# Patient Record
Sex: FEMALE | Race: OTHER | Employment: UNEMPLOYED | URBAN - METROPOLITAN AREA
[De-identification: names, ages, dates, MRNs, and addresses within clinical notes are randomized per-mention and may not be internally consistent; named-entity substitution may affect disease eponyms.]

---

## 2019-09-03 ENCOUNTER — OFFICE VISIT (OUTPATIENT)
Dept: FAMILY MEDICINE CLINIC | Age: 34
End: 2019-09-03

## 2019-09-03 VITALS
WEIGHT: 210 LBS | DIASTOLIC BLOOD PRESSURE: 70 MMHG | RESPIRATION RATE: 18 BRPM | SYSTOLIC BLOOD PRESSURE: 122 MMHG | OXYGEN SATURATION: 96 % | HEART RATE: 74 BPM | TEMPERATURE: 97.8 F | BODY MASS INDEX: 34.99 KG/M2 | HEIGHT: 65 IN

## 2019-09-03 DIAGNOSIS — J30.89 ENVIRONMENTAL AND SEASONAL ALLERGIES: ICD-10-CM

## 2019-09-03 DIAGNOSIS — F41.9 ANXIETY DISORDER, UNSPECIFIED TYPE: Primary | ICD-10-CM

## 2019-09-03 DIAGNOSIS — S93.402A SPRAIN OF LEFT ANKLE, UNSPECIFIED LIGAMENT, INITIAL ENCOUNTER: ICD-10-CM

## 2019-09-03 DIAGNOSIS — J45.20 MILD INTERMITTENT ASTHMA WITHOUT COMPLICATION: ICD-10-CM

## 2019-09-03 RX ORDER — SERTRALINE HYDROCHLORIDE 100 MG/1
100 TABLET, FILM COATED ORAL DAILY
Qty: 30 TAB | Refills: 2 | Status: SHIPPED | OUTPATIENT
Start: 2019-09-03

## 2019-09-03 RX ORDER — CETIRIZINE HCL 10 MG
10 TABLET ORAL DAILY
Qty: 30 TAB | Refills: 2 | Status: SHIPPED | OUTPATIENT
Start: 2019-09-03 | End: 2019-10-17 | Stop reason: SDUPTHER

## 2019-09-03 RX ORDER — CETIRIZINE HCL 10 MG
TABLET ORAL
COMMUNITY
End: 2019-09-03 | Stop reason: SDUPTHER

## 2019-09-03 RX ORDER — IBUPROFEN 800 MG/1
800 TABLET ORAL
Qty: 60 TAB | Refills: 1 | Status: SHIPPED | OUTPATIENT
Start: 2019-09-03

## 2019-09-03 RX ORDER — ACETAMINOPHEN AND CODEINE PHOSPHATE 120; 12 MG/5ML; MG/5ML
SOLUTION ORAL
COMMUNITY

## 2019-09-03 RX ORDER — ALBUTEROL SULFATE 90 UG/1
AEROSOL, METERED RESPIRATORY (INHALATION)
COMMUNITY
End: 2019-11-12 | Stop reason: SDUPTHER

## 2019-09-03 RX ORDER — SERTRALINE HYDROCHLORIDE 100 MG/1
125 TABLET, FILM COATED ORAL DAILY
COMMUNITY
End: 2019-09-03 | Stop reason: SDUPTHER

## 2019-09-03 RX ORDER — SERTRALINE HYDROCHLORIDE 25 MG/1
25 TABLET, FILM COATED ORAL DAILY
Qty: 30 TAB | Refills: 2 | Status: SHIPPED | OUTPATIENT
Start: 2019-09-03

## 2019-09-03 NOTE — PROGRESS NOTES
Today's Date:  9/3/2019   Patient's Name: Celso Hernandez   Patient's :  1985     Subjective:  Chief Complaint   Patient presents with   [address]    Medication Refill       Celso Hernandez  Is a 37 y.o.  female  who presents for initial visit to establish care and medication refill. Last PCP was Dr. Alice Justice in [place], [place] [address]. Has a past medical history of Anxiety (since she was a teen); Started medication in . Takes Sertraline 100 mg daily which used to help her symptoms but states that since moving here last month symptoms have gotten worse. She is more anxious that usual. Denies panic attacks. She tolerating medication well. Also has Environmental allergies for which she take Cetirizine and Flonase. States that she sprained her left ankle while they were moving and she still has some pain which is usually relieve by Motrin and she would like medication refilled. Has been using an ankle brace that helps. Last pap was in May in [place] and was normal.   Has a history of Asthma, uses her inhaler as needed  Also saw a Cardiology for Racing heart  Also talk a Therapist for Anxiety. Will request medical record. Past Medical History:  Past Medical History:   Diagnosis Date    Anxiety        Past Surgical History:  Past Surgical History:   Procedure Laterality Date    HX HEENT      tonsilectomy    HX ORTHOPAEDIC Right     knee    HX ORTHOPAEDIC Right     hand    HX WISDOM TEETH EXTRACTION         Social history:  Social History     Socioeconomic History    Marital status:      Spouse name: Not on file    Number of children: Not on file    Years of education: Not on file    Highest education level: Not on file   Tobacco Use    Smoking status: Current Every Day Smoker     Types: Cigarettes    Smokeless tobacco: Never Used   Substance and Sexual Activity    Alcohol use:  Yes    Drug use: Never    Sexual activity: Yes       Medications:  Current Outpatient Medications   Medication Sig Dispense Refill    norethindrone (ORTHO MICRONOR) 0.35 mg tab Take  by mouth.  sertraline (ZOLOFT) 100 mg tablet Take 125 mg by mouth daily.  cetirizine (ZYRTEC) 10 mg tablet Take  by mouth.  fluticasone propionate (FLONASE NA) by Nasal route.  albuterol (PROAIR HFA) 90 mcg/actuation inhaler Take  by inhalation. Allergies: Allergies   Allergen Reactions    Sudafed [Pseudoephedrine Hcl] Swelling       Past Family History:   Family History   Problem Relation Age of Onset    Cancer Father         pancreatic    Elevated Lipids Father     Cancer Maternal Grandmother         breast cancer    Heart Disease Maternal Grandmother     Diabetes Paternal Grandmother     Heart Disease Paternal Grandmother          REVIEW OF SYSTEMS:   Constitutional  no wt loss, night sweats, unexplained fevers  Oral  no mouth pain, tongue or tooth problems  Ears  no hearing loss, ear pain, fullness, no swallowing problems  Cardiac  no CP, PND, orthopnea, edema, palpitations or syncope  Resp  no wheezing, chronic coughing, dyspnea  GI  no heartburn, nausea, vomiting, change in bowel habits, bleeding,   Urinary  no dysuria, hematuria, flank pain, urgency, frequency  Ortho  no swelling, dec ROM, myalgias  Derm  no nail abnormalities, rashes, lesions of note, hair loss  Psych  anxiety; denies any depression symptoms, no hallucinations or violent ideation  Neuro  no focal weakness, numbness, paresthesias, incoordination, ataxia, involuntary movements      PHYSICAL EXAM:  Visit Vitals  /70 (BP 1 Location: Right arm, BP Patient Position: Sitting)   Pulse 74   Temp 97.8 °F (36.6 °C) (Oral)   Resp 18   Ht 5' 5\" (1.651 m)   Wt 210 lb (95.3 kg)   SpO2 96%   BMI 34.95 kg/m²     Pt alert and oriented x 3. Affect is appropriate. Mood stable. No apparent distress  EYE: PERRL,conjunctiva and lids normal,    Neck--Supple, trachea midline.  No adenopathy,   Lungs --Clear to auscultation and percussion, normal percussion. Heart --Regular rate and rhythm, no murmurs, rubs, gallops, or clicks. Abdomen -- Soft and nontender, no hepatosplenomegaly or masses. Extremities -- Without cyanosis, clubbing, edema. 2+ pulses equally and bilaterally. Mild swelling on lateral aspect of left ankle, mild tenderness, no redness or lesion;  Neuro -- CN 2-12 grossly intact, strength 5/5 with intact pinprick, and soft touch in all  extremities,   Derm-- no obvious abnormalities noted, no rash or redness, skin warm, moist, and dry. Assessment:       1. Anxiety disorder, unspecified type  - Dose increased as the move can be contributing to increased in her  symptoms. - sertraline (ZOLOFT) 100 mg tablet; Take 1 Tab by mouth daily. Dispense: 30 Tab; Refill: 2  - sertraline (ZOLOFT) 25 mg tablet; Take 1 Tab by mouth daily. Dispense: 30 Tab; Refill: 2    2. Environmental and seasonal allergies  - Refilled medication  - cetirizine (ZYRTEC) 10 mg tablet; Take 1 Tab by mouth daily. Dispense: 30 Tab; Refill: 2    3. Sprain of left ankle, unspecified ligament, initial encounter  - Apply heat/Ice, keep extremity elevated. Continue wearing the brace. - ibuprofen (MOTRIN) 800 mg tablet; Take 1 Tab by mouth every eight (8) hours as needed for Pain. Dispense: 60 Tab; Refill: 1    4. Mild intermittent asthma without complication  - Continue used inhaler as prescribed. Patient verbalized understanding and is in agreement with treatment plan as outlined above. All questions answered. Follow-up and Dispositions    · Return if symptoms worsen or fail to improve.      NAZ Telles  9/3/2019

## 2019-09-27 NOTE — TELEPHONE ENCOUNTER
Patient called with questions about her zoloft and her daughter Sarah Pate. Patient was also wanting to ask if an appointment is required for an  authorization for psych for her daughter.

## 2019-09-30 NOTE — TELEPHONE ENCOUNTER
Pt returned call, her question about zoloft is could it be causing her to have trouble focusing?  What should she do about this issue?    (Questions for daughter, Nora Guo, are noted in Cady's chart)

## 2019-10-17 DIAGNOSIS — J30.89 ENVIRONMENTAL AND SEASONAL ALLERGIES: ICD-10-CM

## 2019-10-17 NOTE — TELEPHONE ENCOUNTER
[place] and [place] refill of zyrtec sent there. She is requesting 90 day fill if possible.

## 2019-10-18 RX ORDER — CETIRIZINE HCL 10 MG
10 TABLET ORAL DAILY
Qty: 30 TAB | Refills: 2 | Status: SHIPPED | OUTPATIENT
Start: 2019-10-18

## 2019-11-12 ENCOUNTER — OFFICE VISIT (OUTPATIENT)
Dept: FAMILY MEDICINE CLINIC | Age: 34
End: 2019-11-12

## 2019-11-12 VITALS
DIASTOLIC BLOOD PRESSURE: 67 MMHG | OXYGEN SATURATION: 96 % | HEIGHT: 65 IN | SYSTOLIC BLOOD PRESSURE: 124 MMHG | TEMPERATURE: 97.9 F | HEART RATE: 88 BPM | WEIGHT: 215 LBS | BODY MASS INDEX: 35.82 KG/M2 | RESPIRATION RATE: 18 BRPM

## 2019-11-12 DIAGNOSIS — Z30.09 ENCOUNTER FOR CONTRACEPTIVE PLANNING: Primary | ICD-10-CM

## 2019-11-12 DIAGNOSIS — J45.20 MILD INTERMITTENT ASTHMA WITHOUT COMPLICATION: ICD-10-CM

## 2019-11-12 RX ORDER — ALBUTEROL SULFATE 90 UG/1
2 AEROSOL, METERED RESPIRATORY (INHALATION)
Qty: 1 INHALER | Refills: 5 | Status: SHIPPED | OUTPATIENT
Start: 2019-11-12 | End: 2020-11-30

## 2019-11-12 NOTE — PROGRESS NOTES
Terra Maravilla is a 37 y.o. female here today requesting a referral to GYN for possible tubal ligation

## 2019-11-12 NOTE — PATIENT INSTRUCTIONS
Asthma in Adults: Care Instructions  Your Care Instructions    During an asthma attack, your airways swell and narrow as a reaction to certain things (triggers). This makes it hard to breathe. You may be able to prevent asthma attacks if you avoid the things that set off your asthma symptoms. Keeping your asthma under control and treating symptoms before they get bad can help you avoid severe attacks. If you can control your asthma, you may be able to do all of your normal daily activities. You may also avoid asthma attacks and trips to the hospital.  Follow-up care is a key part of your treatment and safety. Be sure to make and go to all appointments, and call your doctor if you are having problems. It's also a good idea to know your test results and keep a list of the medicines you take. How can you care for yourself at home? · Follow your asthma action plan so you can manage your symptoms at home. An asthma action plan will help you prevent and control airway reactions and will tell you what to do during an asthma attack. If you do not have an asthma action plan, work with your doctor to build one. · Take your asthma medicine exactly as prescribed. Medicine plays an important role in controlling asthma. Talk to your doctor right away if you have any questions about what to take and how to take it. ? Use your quick-relief medicine when you have symptoms of an attack. Quick-relief medicine often is an albuterol inhaler. Some people need to use quick-relief medicine before they exercise. ? Take your controller medicine every day, not just when you have symptoms. Controller medicine is usually an inhaled corticosteroid. The goal is to prevent problems before they occur. Do not use your controller medicine to try to treat an attack that has already started. It does not work fast enough to help. ? If your doctor prescribed corticosteroid pills to use during an attack, take them as directed.  They may take hours to work, but they may shorten the attack and help you breathe better. ? Keep your quick-relief medicine with you at all times. · Talk to your doctor before using other medicines. Some medicines, such as aspirin, can cause asthma attacks in some people. · Check yourself for asthma symptoms to know which step to follow in your action plan. Watch for things like being short of breath, having chest tightness, coughing, and wheezing. Also notice if symptoms wake you up at night or if you get tired quickly when you exercise. · If you have a peak flow meter, use it to check how well you are breathing. This can help you predict when an asthma attack is going to occur. Then you can take medicine to prevent the asthma attack or make it less severe. · See your doctor regularly. These visits will help you learn more about asthma and what you can do to control it. Your doctor will monitor your treatment to make sure the medicine is helping you. · Keep track of your asthma attacks and your treatment. After you have had an attack, write down what triggered it, what helped end it, and any concerns you have about your asthma action plan. Take your diary when you see your doctor. You can then review your asthma action plan and decide if it is working. · Do not smoke or allow others to smoke around you. Avoid smoky places. Smoking makes asthma worse. If you need help quitting, talk to your doctor about stop-smoking programs and medicines. These can increase your chances of quitting for good. · Learn what triggers an asthma attack for you, and avoid the triggers when you can. Common triggers include colds, smoke, air pollution, dust, pollen, mold, pets, cockroaches, stress, and cold air. · Avoid colds and the flu. Get a pneumococcal vaccine shot. If you have had one before, ask your doctor whether you need a second dose. Get a flu vaccine every fall.  If you must be around people with colds or the flu, wash your hands often.  When should you call for help? Call 911 anytime you think you may need emergency care. For example, call if:    · You have severe trouble breathing.    Call your doctor now or seek immediate medical care if:    · Your symptoms do not get better after you have followed your asthma action plan.     · You cough up yellow, dark brown, or bloody mucus (sputum).    Watch closely for changes in your health, and be sure to contact your doctor if:    · Your coughing and wheezing get worse.     · You need to use quick-relief medicine on more than 2 days a week (unless it is just for exercise).     · You need help figuring out what is triggering your asthma attacks. Where can you learn more? Go to http://chayito-artem.info/. Enter P597 in the search box to learn more about \"Asthma in Adults: Care Instructions. \"  Current as of: June 9, 2019  Content Version: 12.2  © 9096-1360 [employer]. Care instructions adapted under license by [employer] (which disclaims liability or warranty for this information). If you have questions about a medical condition or this instruction, always ask your healthcare professional. [place] [address] any warranty or liability for your use of this information.

## 2019-11-12 NOTE — PROGRESS NOTES
Patient: Darrell Pa  Date of Service: 11/12/2019   Provider: NAZ Kelly     REASON FOR VISIT:   Chief Complaint   Patient presents with    Referral Request     GYN        HISTORY OF PRESENT ILLNESS:   Darrell Pa is a 37 y.o. female who presents to the office for acute care. Present requesting referral to GYN for possible tube ligation. Takes birth control pills because of its side effects. Also needs medication refill. Has no other complaint. ALLERGIES:   Allergies   Allergen Reactions    Sudafed [Pseudoephedrine Hcl] Swelling        ACTIVE MEDICAL PROBLEMS:  There is no problem list on file for this patient. MEDICATIONS:   Current Outpatient Medications   Medication Sig Dispense Refill    cetirizine (ZYRTEC) 10 mg tablet Take 1 Tab by mouth daily. 30 Tab 2    norethindrone (ORTHO MICRONOR) 0.35 mg tab Take  by mouth.  fluticasone propionate (FLONASE NA) by Nasal route.  sertraline (ZOLOFT) 100 mg tablet Take 1 Tab by mouth daily. 30 Tab 2    albuterol (PROAIR HFA) 90 mcg/actuation inhaler Take  by inhalation.  sertraline (ZOLOFT) 25 mg tablet Take 1 Tab by mouth daily. 30 Tab 2    ibuprofen (MOTRIN) 800 mg tablet Take 1 Tab by mouth every eight (8) hours as needed for Pain. 60 Tab 1        ROS:   Pertinent positive as above in HPI. All others negative. PHYSICAL EXAMINATION:  Visit Vitals  /67 (BP 1 Location: Right arm, BP Patient Position: Sitting)   Pulse 88   Temp 97.9 °F (36.6 °C) (Oral)   Resp 18   Ht 5' 5\" (1.651 m)   Wt 215 lb (97.5 kg)   LMP 10/29/2019 (Approximate)   SpO2 96%   BMI 35.78 kg/m²      Body mass index is 35.78 kg/m². Appearance: alert, well appearing, and in no distress. Neck supple. No adenopathy or thyromegaly. Lungs: normal breathing   Cardiovascular: normal heart rate  Extremities show no edema  Neurological is normal, no focal findings. ASSESSMENT/PLAN:  Diagnoses and all orders for this visit:    1. Encounter for contraceptive planning  -     REFERRAL TO GYNECOLOGY    2. Mild intermittent asthma without complication  -     albuterol (PROAIR HFA) 90 mcg/actuation inhaler; Take 2 Puffs by inhalation every four (4) hours as needed for Wheezing. Patient advised to return to clinic if symptoms persist or to ED if they become worse. Patient verbalized understanding to above instructions. Follow-up and Dispositions    · Return if symptoms worsen or fail to improve.          NAZ Coleman   11/12/2019   2:05 PM

## 2019-11-21 ENCOUNTER — TELEPHONE (OUTPATIENT)
Dept: FAMILY MEDICINE CLINIC | Age: 34
End: 2019-11-21

## 2019-11-21 NOTE — TELEPHONE ENCOUNTER
Patient is calling wanted to let the nurses know that the place that they have sent her for counseling is no longer accepting new patients at this time. Please refer to another place.

## 2019-11-27 NOTE — TELEPHONE ENCOUNTER
Pt returned call, she states [facility]. Advised her that I will put in a  referral for a different place and let her know who it is so she can call them.  She voiced understanding

## 2019-11-27 NOTE — TELEPHONE ENCOUNTER
LVM for pt to return call re: which counseling office is she talking about. Is it [facility]?  Just want to be sure before starting a new referral

## 2019-12-03 ENCOUNTER — OFFICE VISIT (OUTPATIENT)
Dept: FAMILY MEDICINE CLINIC | Age: 34
End: 2019-12-03

## 2019-12-03 VITALS
HEIGHT: 65 IN | HEART RATE: 109 BPM | OXYGEN SATURATION: 96 % | DIASTOLIC BLOOD PRESSURE: 76 MMHG | WEIGHT: 216.8 LBS | RESPIRATION RATE: 18 BRPM | SYSTOLIC BLOOD PRESSURE: 118 MMHG | BODY MASS INDEX: 36.12 KG/M2 | TEMPERATURE: 97.8 F

## 2019-12-03 DIAGNOSIS — E66.01 SEVERE OBESITY (HCC): ICD-10-CM

## 2019-12-03 DIAGNOSIS — J30.9 ALLERGIC RHINITIS, UNSPECIFIED SEASONALITY, UNSPECIFIED TRIGGER: ICD-10-CM

## 2019-12-03 DIAGNOSIS — J30.89 ENVIRONMENTAL AND SEASONAL ALLERGIES: Primary | ICD-10-CM

## 2019-12-03 RX ORDER — FLUTICASONE PROPIONATE 50 MCG
2 SPRAY, SUSPENSION (ML) NASAL DAILY
Qty: 3 BOTTLE | Refills: 3 | Status: SHIPPED | OUTPATIENT
Start: 2019-12-03 | End: 2019-12-06 | Stop reason: SDUPTHER

## 2019-12-03 NOTE — PROGRESS NOTES
Patient: Lucita Lizama  Date of Service: 12/3/2019   Provider: NAZ Johns     REASON FOR VISIT:   No chief complaint on file. HISTORY OF PRESENT ILLNESS:   Shawna Yuen is a 37 y.o. female who presents to the office for acute care. Present with bilateral ear pain that radiates down to her neck since Friday. States that the pain is intermittent and rates it 8/10. She has tried Ibuprofen 800 mg twice daily with minimal relief. State that she uses Zyrtec daily and Flonase (most days). Reports clear drainage from both ears, sinus pressure, post nasal drip, runny nose; denies cough, fever chills. Has no other complaints. ALLERGIES:   Allergies   Allergen Reactions    Sudafed [Pseudoephedrine Hcl] Swelling        ACTIVE MEDICAL PROBLEMS:  Patient Active Problem List   Diagnosis Code    [facility] E66.01        MEDICATIONS:   Current Outpatient Medications   Medication Sig Dispense Refill    albuterol (PROAIR HFA) 90 mcg/actuation inhaler Take 2 Puffs by inhalation every four (4) hours as needed for Wheezing. 1 Inhaler 5    cetirizine (ZYRTEC) 10 mg tablet Take 1 Tab by mouth daily. 30 Tab 2    norethindrone (ORTHO MICRONOR) 0.35 mg tab Take  by mouth.  fluticasone propionate (FLONASE NA) by Nasal route.  sertraline (ZOLOFT) 100 mg tablet Take 1 Tab by mouth daily. 30 Tab 2    sertraline (ZOLOFT) 25 mg tablet Take 1 Tab by mouth daily. 30 Tab 2    ibuprofen (MOTRIN) 800 mg tablet Take 1 Tab by mouth every eight (8) hours as needed for Pain. 60 Tab 1        ROS:   Pertinent positive as above in HPI. All others negative. PHYSICAL EXAMINATION:  Visit Vitals  /76 (BP 1 Location: Right arm, BP Patient Position: Sitting)   Pulse (!) 109   Temp 97.8 °F (36.6 °C) (Oral)   Resp 18   Ht 5' 5\" (1.651 m)   Wt 216 lb 12.8 oz (98.3 kg)   LMP 11/22/2019 (Approximate)   SpO2 96%   BMI 36.08 kg/m²      Body mass index is 36.08 kg/m².   Appearance: alert, well appearing, and in no distress. Ears: external normal; bilateral ear canal without redness, swelling, drainage. Bulging TM bilaterally otherwise normal.  Throat: pink, moist mucosa. Positive for post nasal drip. Neck supple, Adenopathy   Lungs are clear, good air entry, no wheezes, rhonchi or rales. S1 and S2 normal, no murmurs, regular rate and rhythm. Neurological is normal, no focal findings. ASSESSMENT/PLAN:    1. Environmental and seasonal allergies  - fluticasone propionate (FLONASE) 50 mcg/actuation nasal spray; 2 Sprays by Both Nostrils route daily. Indications: inflammation of the nose due to an allergy  Dispense: 3 Bottle; Refill: 3  - Continue Zyrtec and Flonase daily; take Ibuprofen and Acetaminophen for pain or fever. 2. Allergic rhinitis, unspecified seasonality, unspecified trigger  - fluticasone propionate (FLONASE) 50 mcg/actuation nasal spray; 2 Sprays by Both Nostrils route daily. Indications: inflammation of the nose due to an allergy  Dispense: 3 Bottle; Refill: 3  - She will call the office if symptoms persist for possible steroid dose-pack but if symptoms get worse she will return for re-evaluation. 3. Severe obesity ([address]  - Will discuss this at next visit    Patient advised to return to clinic if symptoms persist or to ED if they become worse. Patient verbalized understanding to above instructions. Follow-up and Dispositions    · Return if symptoms worsen or fail to improve.          NAZ Davis   12/3/2019   11:37 AM

## 2019-12-03 NOTE — PROGRESS NOTES
Abdiel Ventura is a 37 y.o. female here this morning with c/o bilateral ear pain that started Friday. She states the pain radiates down to her neck. She denies any fever or chills but also has sinus pain and pressure.

## 2019-12-06 RX ORDER — FLUTICASONE PROPIONATE 50 MCG
2 SPRAY, SUSPENSION (ML) NASAL DAILY
Qty: 1 BOTTLE | Refills: 3 | Status: SHIPPED | OUTPATIENT
Start: 2019-12-06

## 2020-04-29 PROBLEM — K80.00 ACUTE CALCULOUS CHOLECYSTITIS: Status: ACTIVE | Noted: 2020-04-29

## 2022-03-19 PROBLEM — K80.00 ACUTE CALCULOUS CHOLECYSTITIS: Status: ACTIVE | Noted: 2020-04-29

## 2022-03-19 PROBLEM — E66.01 SEVERE OBESITY (HCC): Status: ACTIVE | Noted: 2019-12-03

## 2023-01-31 RX ORDER — SERTRALINE HYDROCHLORIDE 100 MG/1
100 TABLET, FILM COATED ORAL DAILY
COMMUNITY
Start: 2019-09-03

## 2023-01-31 RX ORDER — ACETAMINOPHEN AND CODEINE PHOSPHATE 120; 12 MG/5ML; MG/5ML
SOLUTION ORAL
COMMUNITY

## 2023-01-31 RX ORDER — FLUTICASONE PROPIONATE 50 MCG
2 SPRAY, SUSPENSION (ML) NASAL DAILY
COMMUNITY
Start: 2019-12-06

## 2023-01-31 RX ORDER — IBUPROFEN 800 MG/1
800 TABLET ORAL EVERY 8 HOURS PRN
COMMUNITY
Start: 2019-09-03

## 2023-01-31 RX ORDER — SERTRALINE HYDROCHLORIDE 25 MG/1
25 TABLET, FILM COATED ORAL DAILY
COMMUNITY
Start: 2019-09-03

## 2023-01-31 RX ORDER — CETIRIZINE HYDROCHLORIDE 10 MG/1
10 TABLET ORAL DAILY
COMMUNITY
Start: 2019-10-18

## 2023-01-31 RX ORDER — ALBUTEROL SULFATE 90 UG/1
2 AEROSOL, METERED RESPIRATORY (INHALATION) EVERY 4 HOURS PRN
COMMUNITY
Start: 2020-11-30